# Patient Record
Sex: MALE | Race: WHITE | NOT HISPANIC OR LATINO | URBAN - METROPOLITAN AREA
[De-identification: names, ages, dates, MRNs, and addresses within clinical notes are randomized per-mention and may not be internally consistent; named-entity substitution may affect disease eponyms.]

---

## 2019-01-23 ENCOUNTER — OFFICE VISIT - HEALTHEAST (OUTPATIENT)
Dept: INTERNAL MEDICINE | Facility: CLINIC | Age: 59
End: 2019-01-23

## 2019-01-23 DIAGNOSIS — E11.9 TYPE 2 DIABETES MELLITUS WITHOUT COMPLICATION, WITHOUT LONG-TERM CURRENT USE OF INSULIN (H): ICD-10-CM

## 2019-01-23 DIAGNOSIS — R53.83 FATIGUE, UNSPECIFIED TYPE: ICD-10-CM

## 2019-01-23 DIAGNOSIS — I10 ESSENTIAL HYPERTENSION, BENIGN: ICD-10-CM

## 2019-01-23 DIAGNOSIS — Z00.00 ROUTINE HEALTH MAINTENANCE: ICD-10-CM

## 2019-01-23 DIAGNOSIS — Z12.5 SCREENING FOR PROSTATE CANCER: ICD-10-CM

## 2019-01-23 LAB
ALBUMIN SERPL-MCNC: 4.4 G/DL (ref 3.5–5)
ALP SERPL-CCNC: 100 U/L (ref 45–120)
ALT SERPL W P-5'-P-CCNC: 49 U/L (ref 0–45)
ANION GAP SERPL CALCULATED.3IONS-SCNC: 14 MMOL/L (ref 5–18)
AST SERPL W P-5'-P-CCNC: 24 U/L (ref 0–40)
BILIRUB SERPL-MCNC: 0.7 MG/DL (ref 0–1)
BUN SERPL-MCNC: 16 MG/DL (ref 8–22)
CALCIUM SERPL-MCNC: 9.5 MG/DL (ref 8.5–10.5)
CHLORIDE BLD-SCNC: 103 MMOL/L (ref 98–107)
CO2 SERPL-SCNC: 22 MMOL/L (ref 22–31)
CREAT SERPL-MCNC: 0.98 MG/DL (ref 0.7–1.3)
ERYTHROCYTE [DISTWIDTH] IN BLOOD BY AUTOMATED COUNT: 12.4 % (ref 11–14.5)
GFR SERPL CREATININE-BSD FRML MDRD: >60 ML/MIN/1.73M2
GLUCOSE BLD-MCNC: 271 MG/DL (ref 70–125)
HBA1C MFR BLD: 13.7 % (ref 3.5–6)
HCT VFR BLD AUTO: 48.2 % (ref 40–54)
HGB BLD-MCNC: 16.2 G/DL (ref 14–18)
LDLC SERPL CALC-MCNC: 147 MG/DL
MCH RBC QN AUTO: 30.1 PG (ref 27–34)
MCHC RBC AUTO-ENTMCNC: 33.6 G/DL (ref 32–36)
MCV RBC AUTO: 90 FL (ref 80–100)
PLATELET # BLD AUTO: 195 THOU/UL (ref 140–440)
PMV BLD AUTO: 8.7 FL (ref 7–10)
POTASSIUM BLD-SCNC: 3.9 MMOL/L (ref 3.5–5)
PROT SERPL-MCNC: 7 G/DL (ref 6–8)
PSA SERPL-MCNC: 4.3 NG/ML (ref 0–3.5)
RBC # BLD AUTO: 5.39 MILL/UL (ref 4.4–6.2)
SODIUM SERPL-SCNC: 139 MMOL/L (ref 136–145)
TSH SERPL DL<=0.005 MIU/L-ACNC: 1.31 UIU/ML (ref 0.3–5)
WBC: 6.3 THOU/UL (ref 4–11)

## 2019-01-23 RX ORDER — GLUCOSAMINE HCL/CHONDROITIN SU 500-400 MG
CAPSULE ORAL 2 TIMES DAILY
Status: SHIPPED | COMMUNITY
Start: 2010-05-17

## 2019-01-23 ASSESSMENT — MIFFLIN-ST. JEOR: SCORE: 1878.55

## 2019-01-24 ENCOUNTER — COMMUNICATION - HEALTHEAST (OUTPATIENT)
Dept: INTERNAL MEDICINE | Facility: CLINIC | Age: 59
End: 2019-01-24

## 2019-02-07 ENCOUNTER — OFFICE VISIT - HEALTHEAST (OUTPATIENT)
Dept: INTERNAL MEDICINE | Facility: CLINIC | Age: 59
End: 2019-02-07

## 2019-02-07 DIAGNOSIS — K40.90 LEFT INGUINAL HERNIA: ICD-10-CM

## 2019-02-07 DIAGNOSIS — E11.9 TYPE 2 DIABETES MELLITUS WITHOUT COMPLICATION, WITHOUT LONG-TERM CURRENT USE OF INSULIN (H): ICD-10-CM

## 2019-02-07 DIAGNOSIS — R97.20 ELEVATED PROSTATE SPECIFIC ANTIGEN (PSA): ICD-10-CM

## 2019-02-07 DIAGNOSIS — J32.9 CHRONIC SINUSITIS, UNSPECIFIED LOCATION: ICD-10-CM

## 2019-02-07 DIAGNOSIS — I10 ESSENTIAL HYPERTENSION, BENIGN: ICD-10-CM

## 2019-02-07 DIAGNOSIS — E78.00 HYPERCHOLESTEROLEMIA: ICD-10-CM

## 2019-02-07 DIAGNOSIS — K76.0 FATTY LIVER: ICD-10-CM

## 2019-02-07 DIAGNOSIS — Z51.81 MEDICATION MONITORING ENCOUNTER: ICD-10-CM

## 2019-02-07 DIAGNOSIS — Z80.0 FAMILY HISTORY OF COLON CANCER IN MOTHER: ICD-10-CM

## 2019-02-07 LAB
ALBUMIN SERPL-MCNC: 4.2 G/DL (ref 3.5–5)
ALP SERPL-CCNC: 82 U/L (ref 45–120)
ALT SERPL W P-5'-P-CCNC: 41 U/L (ref 0–45)
ANION GAP SERPL CALCULATED.3IONS-SCNC: 8 MMOL/L (ref 5–18)
AST SERPL W P-5'-P-CCNC: 23 U/L (ref 0–40)
BILIRUB SERPL-MCNC: 1.2 MG/DL (ref 0–1)
BUN SERPL-MCNC: 15 MG/DL (ref 8–22)
CALCIUM SERPL-MCNC: 9.7 MG/DL (ref 8.5–10.5)
CHLORIDE BLD-SCNC: 104 MMOL/L (ref 98–107)
CO2 SERPL-SCNC: 26 MMOL/L (ref 22–31)
CREAT SERPL-MCNC: 1.04 MG/DL (ref 0.7–1.3)
GFR SERPL CREATININE-BSD FRML MDRD: >60 ML/MIN/1.73M2
GLUCOSE BLD-MCNC: 274 MG/DL (ref 70–125)
POTASSIUM BLD-SCNC: 4.6 MMOL/L (ref 3.5–5)
PROT SERPL-MCNC: 6.6 G/DL (ref 6–8)
SODIUM SERPL-SCNC: 138 MMOL/L (ref 136–145)

## 2019-02-08 ENCOUNTER — COMMUNICATION - HEALTHEAST (OUTPATIENT)
Dept: INTERNAL MEDICINE | Facility: CLINIC | Age: 59
End: 2019-02-08

## 2019-03-21 ENCOUNTER — COMMUNICATION - HEALTHEAST (OUTPATIENT)
Dept: INTERNAL MEDICINE | Facility: CLINIC | Age: 59
End: 2019-03-21

## 2019-03-21 ENCOUNTER — OFFICE VISIT - HEALTHEAST (OUTPATIENT)
Dept: INTERNAL MEDICINE | Facility: CLINIC | Age: 59
End: 2019-03-21

## 2019-03-21 DIAGNOSIS — E78.00 HYPERCHOLESTEROLEMIA: ICD-10-CM

## 2019-03-21 DIAGNOSIS — Z51.81 MEDICATION MONITORING ENCOUNTER: ICD-10-CM

## 2019-03-21 DIAGNOSIS — E11.9 TYPE 2 DIABETES MELLITUS WITHOUT COMPLICATION, WITHOUT LONG-TERM CURRENT USE OF INSULIN (H): ICD-10-CM

## 2019-03-21 DIAGNOSIS — I10 ESSENTIAL HYPERTENSION, BENIGN: ICD-10-CM

## 2019-03-21 DIAGNOSIS — R97.20 ELEVATED PROSTATE SPECIFIC ANTIGEN (PSA): ICD-10-CM

## 2019-03-21 LAB
ANION GAP SERPL CALCULATED.3IONS-SCNC: 13 MMOL/L (ref 5–18)
BUN SERPL-MCNC: 16 MG/DL (ref 8–22)
CALCIUM SERPL-MCNC: 9.3 MG/DL (ref 8.5–10.5)
CHLORIDE BLD-SCNC: 108 MMOL/L (ref 98–107)
CO2 SERPL-SCNC: 20 MMOL/L (ref 22–31)
CREAT SERPL-MCNC: 0.92 MG/DL (ref 0.7–1.3)
GFR SERPL CREATININE-BSD FRML MDRD: >60 ML/MIN/1.73M2
GLUCOSE BLD-MCNC: 112 MG/DL (ref 70–125)
POTASSIUM BLD-SCNC: 3.9 MMOL/L (ref 3.5–5)
PSA SERPL-MCNC: 4.7 NG/ML (ref 0–3.5)
SODIUM SERPL-SCNC: 141 MMOL/L (ref 136–145)

## 2019-03-22 ENCOUNTER — COMMUNICATION - HEALTHEAST (OUTPATIENT)
Dept: INTERNAL MEDICINE | Facility: CLINIC | Age: 59
End: 2019-03-22

## 2019-05-06 ENCOUNTER — COMMUNICATION - HEALTHEAST (OUTPATIENT)
Dept: INTERNAL MEDICINE | Facility: CLINIC | Age: 59
End: 2019-05-06

## 2019-05-06 DIAGNOSIS — I10 ESSENTIAL HYPERTENSION, BENIGN: ICD-10-CM

## 2019-07-29 ENCOUNTER — OFFICE VISIT - HEALTHEAST (OUTPATIENT)
Dept: INTERNAL MEDICINE | Facility: CLINIC | Age: 59
End: 2019-07-29

## 2019-07-29 DIAGNOSIS — I10 ESSENTIAL HYPERTENSION, BENIGN: ICD-10-CM

## 2019-07-29 DIAGNOSIS — E11.9 TYPE 2 DIABETES MELLITUS WITHOUT COMPLICATION, WITHOUT LONG-TERM CURRENT USE OF INSULIN (H): ICD-10-CM

## 2019-07-29 LAB
ALBUMIN SERPL-MCNC: 4.6 G/DL (ref 3.5–5)
ALP SERPL-CCNC: 65 U/L (ref 45–120)
ALT SERPL W P-5'-P-CCNC: 31 U/L (ref 0–45)
ANION GAP SERPL CALCULATED.3IONS-SCNC: 12 MMOL/L (ref 5–18)
AST SERPL W P-5'-P-CCNC: 23 U/L (ref 0–40)
BILIRUB SERPL-MCNC: 1.4 MG/DL (ref 0–1)
BUN SERPL-MCNC: 14 MG/DL (ref 8–22)
CALCIUM SERPL-MCNC: 9.7 MG/DL (ref 8.5–10.5)
CHLORIDE BLD-SCNC: 105 MMOL/L (ref 98–107)
CO2 SERPL-SCNC: 23 MMOL/L (ref 22–31)
CREAT SERPL-MCNC: 1.16 MG/DL (ref 0.7–1.3)
CREAT UR-MCNC: 227.5 MG/DL
GFR SERPL CREATININE-BSD FRML MDRD: >60 ML/MIN/1.73M2
GLUCOSE BLD-MCNC: 151 MG/DL (ref 70–125)
HBA1C MFR BLD: 6.8 % (ref 3.5–6)
MICROALBUMIN UR-MCNC: 1.58 MG/DL (ref 0–1.99)
MICROALBUMIN/CREAT UR: 6.9 MG/G
POTASSIUM BLD-SCNC: 4.1 MMOL/L (ref 3.5–5)
PROT SERPL-MCNC: 7 G/DL (ref 6–8)
SODIUM SERPL-SCNC: 140 MMOL/L (ref 136–145)

## 2019-12-18 ENCOUNTER — COMMUNICATION - HEALTHEAST (OUTPATIENT)
Dept: INTERNAL MEDICINE | Facility: CLINIC | Age: 59
End: 2019-12-18

## 2019-12-18 DIAGNOSIS — I10 ESSENTIAL HYPERTENSION, BENIGN: ICD-10-CM

## 2020-01-14 ENCOUNTER — COMMUNICATION - HEALTHEAST (OUTPATIENT)
Dept: INTERNAL MEDICINE | Facility: CLINIC | Age: 60
End: 2020-01-14

## 2020-01-14 DIAGNOSIS — I10 ESSENTIAL HYPERTENSION, BENIGN: ICD-10-CM

## 2020-07-14 ENCOUNTER — COMMUNICATION - HEALTHEAST (OUTPATIENT)
Dept: INTERNAL MEDICINE | Facility: CLINIC | Age: 60
End: 2020-07-14

## 2020-07-14 DIAGNOSIS — I10 ESSENTIAL HYPERTENSION, BENIGN: ICD-10-CM

## 2020-07-14 RX ORDER — AMLODIPINE BESYLATE 5 MG/1
5 TABLET ORAL DAILY
Qty: 90 TABLET | Refills: 3 | Status: SHIPPED | OUTPATIENT
Start: 2020-07-14

## 2020-08-06 ENCOUNTER — COMMUNICATION - HEALTHEAST (OUTPATIENT)
Dept: INTERNAL MEDICINE | Facility: CLINIC | Age: 60
End: 2020-08-06

## 2020-08-06 DIAGNOSIS — I10 ESSENTIAL HYPERTENSION, BENIGN: ICD-10-CM

## 2020-10-01 ENCOUNTER — COMMUNICATION - HEALTHEAST (OUTPATIENT)
Dept: INTERNAL MEDICINE | Facility: CLINIC | Age: 60
End: 2020-10-01

## 2020-10-01 DIAGNOSIS — E11.9 TYPE 2 DIABETES MELLITUS WITHOUT COMPLICATION, WITHOUT LONG-TERM CURRENT USE OF INSULIN (H): ICD-10-CM

## 2020-10-30 ENCOUNTER — COMMUNICATION - HEALTHEAST (OUTPATIENT)
Dept: INTERNAL MEDICINE | Facility: CLINIC | Age: 60
End: 2020-10-30

## 2020-10-30 DIAGNOSIS — I10 ESSENTIAL HYPERTENSION, BENIGN: ICD-10-CM

## 2020-11-02 ENCOUNTER — COMMUNICATION - HEALTHEAST (OUTPATIENT)
Dept: INTERNAL MEDICINE | Facility: CLINIC | Age: 60
End: 2020-11-02

## 2020-11-02 DIAGNOSIS — E11.9 TYPE 2 DIABETES MELLITUS WITHOUT COMPLICATION, WITHOUT LONG-TERM CURRENT USE OF INSULIN (H): ICD-10-CM

## 2020-11-06 ENCOUNTER — COMMUNICATION - HEALTHEAST (OUTPATIENT)
Dept: INTERNAL MEDICINE | Facility: CLINIC | Age: 60
End: 2020-11-06

## 2020-11-06 DIAGNOSIS — E11.9 TYPE 2 DIABETES MELLITUS WITHOUT COMPLICATION, WITHOUT LONG-TERM CURRENT USE OF INSULIN (H): ICD-10-CM

## 2020-11-20 ENCOUNTER — COMMUNICATION - HEALTHEAST (OUTPATIENT)
Dept: INTERNAL MEDICINE | Facility: CLINIC | Age: 60
End: 2020-11-20

## 2020-11-20 DIAGNOSIS — E11.9 TYPE 2 DIABETES MELLITUS WITHOUT COMPLICATION, WITHOUT LONG-TERM CURRENT USE OF INSULIN (H): ICD-10-CM

## 2020-11-20 DIAGNOSIS — I10 ESSENTIAL HYPERTENSION, BENIGN: ICD-10-CM

## 2020-12-21 ENCOUNTER — COMMUNICATION - HEALTHEAST (OUTPATIENT)
Dept: FAMILY MEDICINE | Facility: CLINIC | Age: 60
End: 2020-12-21

## 2020-12-21 DIAGNOSIS — E11.9 TYPE 2 DIABETES MELLITUS WITHOUT COMPLICATION, WITHOUT LONG-TERM CURRENT USE OF INSULIN (H): ICD-10-CM

## 2020-12-28 ENCOUNTER — COMMUNICATION - HEALTHEAST (OUTPATIENT)
Dept: INTERNAL MEDICINE | Facility: CLINIC | Age: 60
End: 2020-12-28

## 2020-12-28 DIAGNOSIS — E11.9 TYPE 2 DIABETES MELLITUS WITHOUT COMPLICATION, WITHOUT LONG-TERM CURRENT USE OF INSULIN (H): ICD-10-CM

## 2021-01-25 ENCOUNTER — COMMUNICATION - HEALTHEAST (OUTPATIENT)
Dept: INTERNAL MEDICINE | Facility: CLINIC | Age: 61
End: 2021-01-25

## 2021-01-25 DIAGNOSIS — E11.9 TYPE 2 DIABETES MELLITUS WITHOUT COMPLICATION, WITHOUT LONG-TERM CURRENT USE OF INSULIN (H): ICD-10-CM

## 2021-02-23 ENCOUNTER — COMMUNICATION - HEALTHEAST (OUTPATIENT)
Dept: INTERNAL MEDICINE | Facility: CLINIC | Age: 61
End: 2021-02-23

## 2021-02-23 DIAGNOSIS — E11.9 TYPE 2 DIABETES MELLITUS WITHOUT COMPLICATION, WITHOUT LONG-TERM CURRENT USE OF INSULIN (H): ICD-10-CM

## 2021-03-12 ENCOUNTER — COMMUNICATION - HEALTHEAST (OUTPATIENT)
Dept: INTERNAL MEDICINE | Facility: CLINIC | Age: 61
End: 2021-03-12

## 2021-03-12 DIAGNOSIS — I10 ESSENTIAL HYPERTENSION, BENIGN: ICD-10-CM

## 2021-03-12 RX ORDER — LOSARTAN POTASSIUM 50 MG/1
TABLET ORAL
Qty: 180 TABLET | Refills: 0 | Status: SHIPPED | OUTPATIENT
Start: 2021-03-12

## 2021-03-15 ENCOUNTER — COMMUNICATION - HEALTHEAST (OUTPATIENT)
Dept: INTERNAL MEDICINE | Facility: CLINIC | Age: 61
End: 2021-03-15

## 2021-03-15 DIAGNOSIS — E11.9 TYPE 2 DIABETES MELLITUS WITHOUT COMPLICATION, WITHOUT LONG-TERM CURRENT USE OF INSULIN (H): ICD-10-CM

## 2021-05-28 NOTE — TELEPHONE ENCOUNTER
Medication Question or Clarification  Who is calling: Patient  What medication are you calling about? (include dose and sig)   amLODIPine (NORVASC) 2.5 MG tablet 90 tablet 1 3/21/2019     Sig - Route: Take 1 tablet (2.5 mg total) by mouth daily. - Oral    Sent to pharmacy as: amLODIPine (NORVASC) 2.5 MG tablet    E-Prescribing Status: Receipt confirmed by pharmacy (3/21/2019 10:55 AM CDT)        Who prescribed the medication?: Jonel Bell MD   What is your question/concern?: Patient is requesting an increase to 5 mg tablets stating I take my b/p regularly and its still running about 145/90s.  Patient also stated I will be leaving out of town and would like to refill this medication early to take with me but the pharmacy has denied this request. Please give pharmacy approval to fill early if increase is not approved.   Pharmacy: [facility]  Okay to leave a detailed message?: Yes  Site CMT - Please call the pharmacy to obtain any additional needed information.

## 2021-05-30 NOTE — PATIENT INSTRUCTIONS - HE
Refills of medication sent in.    Recheck hemoglobin A1c today.  Goal A1c should be less than 7.    Blood pressure looks excellent today.    Continue with diet and exercise changes.  You are making good progress.    If hemoglobin A1c is still not at goal, we will increase metformin to 1000 mg twice daily.    You could consider splitting up your amlodipine to a 2.5 mg dose twice daily for elevated afternoon blood pressures.    Follow-up with either I or Dr. Jonel Bell in 3 months for repeat diabetes check.

## 2021-05-30 NOTE — PROGRESS NOTES
Clinic Note    Assessment:     Assessment and Plan:  1. Type 2 diabetes mellitus without complication, without long-term current use of insulin (H)  Degree of control to be determined.  If he remains outside of goal range, we will increase his metformin to 1000 mg twice daily.  Follow-up in 3 months.  - Comprehensive Metabolic Panel  - Microalbumin, Random Urine  - Glycosylated Hemoglobin A1c  - metFORMIN (GLUCOPHAGE) 500 MG tablet; Take 1 with breakfast.  Take 2 tablets with supper.  Dispense: 270 tablet; Refill: 3    2. Essential hypertension, benign  Well-controlled on amlodipine 5 mg and losartan 50 mg twice daily.  He has had periodic elevated blood pressures in the afternoon.  I told him he could split up his amlodipine to 2.5 mg twice daily to see if this would help.     Patient Instructions   Refills of medication sent in.    Recheck hemoglobin A1c today.  Goal A1c should be less than 7.    Blood pressure looks excellent today.    Continue with diet and exercise changes.  You are making good progress.    If hemoglobin A1c is still not at goal, we will increase metformin to 1000 mg twice daily.    You could consider splitting up your amlodipine to a 2.5 mg dose twice daily for elevated afternoon blood pressures.    Follow-up with either I or Dr. Jonel Bell in 3 months for repeat diabetes check.    Return in about 3 months (around 10/29/2019).         Subjective:      Patient comes to clinic today for diabetes and blood pressure check.    He was last seen by his PCP, Dr. Jonel Bell on 3/21/2019.  Blood pressure had been elevated at that time, he was told to increase his amlodipine to 5 mg daily.    Diabetes had been poorly controlled, with hemoglobin A1c greater than 13.  He has been making some significant dietary and activity changes at home.  Trying to increase fruits and vegetables.  He has decreased his simple carbohydrates.    He walks his dogs regularly.    Still checking his blood sugars multiple  times per day.  Most fasting sugars in the morning goal range, but some may be as high as 180.  These measurements seem to be correlated to dietary indiscretion the evening before.    No hypoglycemia.    He has been making some strides with weight loss.  Down about 20 pounds from a high of 240 pounds earlier.    The following portions of the patient's history were reviewed and updated as appropriate: Allergies, medications, problems, prior note.    Review of Systems:    Review is otherwise negative except for what is mentioned above.     Social Hx:    Social History     Tobacco Use   Smoking Status Never Smoker   Smokeless Tobacco Never Used         Objective:     Vitals:    07/29/19 1044   BP: 114/77   Pulse: 83   Weight: (!) 224 lb 11.2 oz (101.9 kg)       Exam:    General: No apparent distress. Calm. Alert and Oriented X3. Pt behavior is appropriate.      Patient Active Problem List   Diagnosis     Fatty liver     Hypercholesterolemia     Elevated prostate specific antigen (PSA)     Type 2 diabetes mellitus without complication, without long-term current use of insulin (H)     Essential hypertension, benign     Family history of colon cancer in mother     Left inguinal hernia     Chronic sinusitis, unspecified location     Current Outpatient Medications   Medication Sig Dispense Refill     amLODIPine (NORVASC) 5 MG tablet Take 1 tablet (5 mg total) by mouth daily. 90 tablet 3     ascorbate calcium (VITAMIN C ORAL) Take 1 tablet by mouth daily.       aspirin 81 MG EC tablet Take 1 tablet (81 mg total) by mouth daily.  0     b complex vitamins (B COMPLEX-VITAMIN B12) tablet Take 1 tablet by mouth 3 (three) times a week.       BIOTIN ORAL Take 1 tablet by mouth 3 (three) times a week.              blood glucose test (GLUCOSE BLOOD) strips 2 (two) times a day.       cholecalciferol, vitamin D3, (VITAMIN D3 ORAL) Take 2,000 Units by mouth 3 (three) times a week. Not taken during summer.              chromium  picolin,hist/minerals (CHROMIUM PICOLINAT,HISTID-MINS ORAL) Take 1 tablet by mouth 3 (three) times a week.              GYMNEMA LEAF, BULK, MISC Use 1 capsule As Directed 3 (three) times a week.              losartan (COZAAR) 50 MG tablet Take 1 tablet (50 mg total) by mouth 2 (two) times a day. 180 tablet 3     metFORMIN (GLUCOPHAGE) 500 MG tablet Take 1 with breakfast.  Take 2 tablets with supper. 270 tablet 3     MULTIVITAMIN ORAL Take 1 tablet by mouth daily.       UNABLE TO FIND 1 capsule taken orally 3 times weekly. Med Name: Beet root.       UNABLE TO FIND 1 capsule taken orally daily. Med Name: Tumeric.       UNABLE TO FIND 1 capsule taken 3 times weekly. Med Name: Stinging Nettle.       UNABLE TO FIND 2 capsules taken daily. Med Name: Apple cider vinegar.       VANADYL SULFATE, BULK, MISC Use 1 tablet As Directed 3 (three) times a week.       vitamin E acetate (VITAMIN E ORAL) Take 1 tablet by mouth 3 (three) times a week.              No current facility-administered medications for this visit.        I spent 20 minutes with patient face to face, of which >50% was counseling regarding the above plan       Víctor Lemus (Rob), GISSEL    7/29/2019

## 2021-06-02 VITALS — BODY MASS INDEX: 33.85 KG/M2 | WEIGHT: 235.9 LBS

## 2021-06-02 VITALS — WEIGHT: 236.4 LBS | BODY MASS INDEX: 33.84 KG/M2 | HEIGHT: 70 IN

## 2021-06-02 VITALS — WEIGHT: 219.4 LBS | BODY MASS INDEX: 31.48 KG/M2

## 2021-06-03 VITALS — WEIGHT: 224.7 LBS | BODY MASS INDEX: 32.24 KG/M2

## 2021-06-04 NOTE — TELEPHONE ENCOUNTER
Medication refill request declined: requested too soon     Disp Refills Start End     losartan (COZAAR) 50 MG tablet 180 tablet 3 7/29/2019     Sig - Route: Take 1 tablet (50 mg total) by mouth 2 (two) times a day. - Oral    Sent to pharmacy as: losartan (COZAAR) 50 MG tablet    E-Prescribing Status: Receipt confirmed by pharmacy (7/29/2019 11:05 AM CDT)      Zora Ramirez RN BSBA [facility] Triage/Med Refill 12/20/2019 11:41 AM

## 2021-06-05 NOTE — TELEPHONE ENCOUNTER
Medication Request  Medication name: Losartan 25 mg  [facility]ed [facility]  Reason for request: Unknown, request came by fax from pharmacy.   When did you use medication last?:  Unknown, request came by fax from pharmacy.   Patient offered appointment:  N/A - electronic request  Okay to leave a detailed message: no

## 2021-06-09 NOTE — TELEPHONE ENCOUNTER
Refill request received from [facility] via fax for a refill of Amlodapine. Order pended.    Last OV/VV on 7/29/2019  Next scheduled appointment with PCP None

## 2021-06-10 NOTE — TELEPHONE ENCOUNTER
RN cannot approve Refill Request    RN can NOT refill this medication Protocol failed and NO refill given. Last office visit: 3/21/2019 Jonel Bell MD Last Physical: 1/23/2019 Last MTM visit: Visit date not found Last visit same specialty: 7/29/2019 Víctor Lemus CNP.  Next visit within 3 mo: Visit date not found  Next physical within 3 mo: Visit date not found      Halle Martinez, [facility] Triage/Med Refill 8/6/2020    Requested Prescriptions   Pending Prescriptions Disp Refills     losartan (COZAAR) 50 MG tablet [Pharmacy Med Name: LOSARTAN POTASSIUM 50 MG TAB] 180 tablet 2     Sig: TAKE 1 TABLET BY MOUTH TWICE A DAY       Angiotensin Receptor Blocker Protocol Failed - 8/6/2020  1:17 AM        Failed - PCP or prescribing provider visit in past 12 months       Last office visit with prescriber/PCP: 3/21/2019 Jonel Bell MD OR same dept: Visit date not found OR same specialty: 7/29/2019 Víctor Lemus CNP  Last physical: 1/23/2019 Last MTM visit: Visit date not found   Next visit within 3 mo: Visit date not found  Next physical within 3 mo: Visit date not found  Prescriber OR PCP: Jonel Bell MD  Last diagnosis associated with med order: 1. Essential hypertension, benign  - losartan (COZAAR) 50 MG tablet [Pharmacy Med Name: LOSARTAN POTASSIUM 50 MG TAB]; TAKE 1 TABLET BY MOUTH TWICE A DAY  Dispense: 180 tablet; Refill: 2    If protocol passes may refill for 12 months if within 3 months of last provider visit (or a total of 15 months).             Failed - Serum potassium within the past 12 months     No results found for: LN-POTASSIUM          Failed - Blood pressure filed in past 12 months     BP Readings from Last 1 Encounters:   07/29/19 114/77             Failed - Serum creatinine within the past 12 months     Creatinine   Date Value Ref Range Status   07/29/2019 1.16 0.70 - 1.30 mg/dL Final

## 2021-06-10 NOTE — TELEPHONE ENCOUNTER
LMTCB - please help pt schedule his yearly exam     STEPHANY Gallegos  11:44 AM ........ 8/6/2020

## 2021-06-10 NOTE — TELEPHONE ENCOUNTER
Tell patient that he is overdue for his yearly physical.  Have patient make physical exam appointment this fall.

## 2021-06-10 NOTE — TELEPHONE ENCOUNTER
Who is calling:  Patient  Reason for Call:    Returning call.  This nurse relayed Provider message to patient.  Transferred caller to scheduling.  Date of last appointment with primary care: 7/29/2019  Okay to leave a detailed message: Yes

## 2021-06-11 NOTE — TELEPHONE ENCOUNTER
Refill Request  Did you contact pharmacy: Yes  Medication name:   Requested Prescriptions     Pending Prescriptions Disp Refills     metFORMIN (GLUCOPHAGE) 500 MG tablet 270 tablet 3     Sig: Take 1 with breakfast.  Take 2 tablets with supper.     Who prescribed the medication: Víctor Lemus  [facility]  Is patient out of medication: N/A  Patient notified refills processed in 3 business days:  N/A  Okay to leave a detailed message: yes

## 2021-06-12 NOTE — TELEPHONE ENCOUNTER
RN cannot approve Refill Request    RN can NOT refill this medication Protocol failed and NO refill given. Last office visit: 3/21/2019 Jonel Bell MD Last Physical: 1/23/2019 Last MTM visit: Visit date not found Last visit same specialty: 7/29/2019 Víctor Lemus CNP.  Next visit within 3 mo: Visit date not found  Next physical within 3 mo: Visit date not found      Halle Martinez, [facility] Triage/Med Refill 10/5/2020    Requested Prescriptions   Pending Prescriptions Disp Refills     metFORMIN (GLUCOPHAGE) 500 MG tablet 270 tablet 3     Sig: Take 1 with breakfast.  Take 2 tablets with supper.       Metformin Refill Protocol Failed - 10/1/2020  4:13 PM        Failed - Blood pressure in last 12 months     BP Readings from Last 1 Encounters:   07/29/19 114/77             Failed - LFT or AST or ALT in last 12 months     Albumin   Date Value Ref Range Status   07/29/2019 4.6 3.5 - 5.0 g/dL Final     Bilirubin, Total   Date Value Ref Range Status   07/29/2019 1.4 (H) 0.0 - 1.0 mg/dL Final     Alkaline Phosphatase   Date Value Ref Range Status   07/29/2019 65 45 - 120 U/L Final     AST   Date Value Ref Range Status   07/29/2019 23 0 - 40 U/L Final     ALT   Date Value Ref Range Status   07/29/2019 31 0 - 45 U/L Final     Protein, Total   Date Value Ref Range Status   07/29/2019 7.0 6.0 - 8.0 g/dL Final                Failed - GFR or Serum Creatinine in last 6 months     GFR MDRD Non Af Amer   Date Value Ref Range Status   07/29/2019 >60 >60 mL/min/1.73m2 Final     GFR MDRD Af Amer   Date Value Ref Range Status   07/29/2019 >60 >60 mL/min/1.73m2 Final             Failed - Visit with PCP or prescribing provider visit in last 6 months or next 3 months     Last office visit with prescriber/PCP: Visit date not found OR same dept: Visit date not found OR same specialty: 7/29/2019 Víctor Lemus CNP Last physical: Visit date not found Last MTM visit: Visit date not found         Next appt within 3 mo: Visit date  not found  Next physical within 3 mo: Visit date not found  Prescriber OR PCP: Jonel Bell MD  Last diagnosis associated with med order: 1. Type 2 diabetes mellitus without complication, without long-term current use of insulin (H)  - metFORMIN (GLUCOPHAGE) 500 MG tablet; Take 1 with breakfast.  Take 2 tablets with supper.  Dispense: 270 tablet; Refill: 3     If protocol passes may refill for 12 months if within 3 months of last provider visit (or a total of 15 months).           Failed - A1C in last 6 months     Hemoglobin A1c   Date Value Ref Range Status   07/29/2019 6.8 (H) 3.5 - 6.0 % Final               Failed - Microalbumin in last year      Microalbumin, Random Urine   Date Value Ref Range Status   07/29/2019 1.58 0.00 - 1.99 mg/dL Final

## 2021-06-12 NOTE — TELEPHONE ENCOUNTER
RN cannot approve Refill Request    RN can NOT refill this medication Protocol failed and NO refill given. Last office visit: 3/21/2019 Jonel Bell MD Last Physical: 1/23/2019 Last MTM visit: Visit date not found Last visit same specialty: 7/29/2019 Víctro Lemus CNP.  Next visit within 3 mo: Visit date not found  Next physical within 3 mo: Visit date not found      Halle Martinez, [facility] Triage/Med Refill 11/2/2020    Requested Prescriptions   Pending Prescriptions Disp Refills     losartan (COZAAR) 50 MG tablet 180 tablet 0     Sig: Take 1 tablet (50 mg total) by mouth 2 (two) times a day.       Angiotensin Receptor Blocker Protocol Failed - 10/30/2020  2:44 PM        Failed - PCP or prescribing provider visit in past 12 months       Last office visit with prescriber/PCP: 3/21/2019 Jonel Bell MD OR same dept: Visit date not found OR same specialty: 7/29/2019 Víctor Lemus CNP  Last physical: 1/23/2019 Last MTM visit: Visit date not found   Next visit within 3 mo: Visit date not found  Next physical within 3 mo: Visit date not found  Prescriber OR PCP: Jonel Bell MD  Last diagnosis associated with med order: 1. Essential hypertension, benign  - losartan (COZAAR) 50 MG tablet; Take 1 tablet (50 mg total) by mouth 2 (two) times a day.  Dispense: 180 tablet; Refill: 0    If protocol passes may refill for 12 months if within 3 months of last provider visit (or a total of 15 months).             Failed - Serum potassium within the past 12 months     No results found for: LN-POTASSIUM          Failed - Blood pressure filed in past 12 months     BP Readings from Last 1 Encounters:   07/29/19 114/77             Failed - Serum creatinine within the past 12 months     Creatinine   Date Value Ref Range Status   07/29/2019 1.16 0.70 - 1.30 mg/dL Final

## 2021-06-12 NOTE — TELEPHONE ENCOUNTER
RN cannot approve Refill Request    RN can NOT refill this medication Protocol failed and NO refill given. Last office visit: Visit date not found Last Physical: Visit date not found Last MTM visit: Visit date not found Last visit same specialty: 7/29/2019 Víctor Lemus CNP.  Next visit within 3 mo: Visit date not found  Next physical within 3 mo: Visit date not found      Halle Martinez, [facility] Triage/Med Refill 11/4/2020    Requested Prescriptions   Pending Prescriptions Disp Refills     metFORMIN (GLUCOPHAGE) 500 MG tablet [Pharmacy Med Name: METFORMIN  MG TABLET] 90 tablet 0     Sig: TAKE 1 WITH BREAKFAST. TAKE 2 TABLETS WITH SUPPER.       Metformin Refill Protocol Failed - 11/2/2020  9:31 AM        Failed - Blood pressure in last 12 months     BP Readings from Last 1 Encounters:   07/29/19 114/77             Failed - LFT or AST or ALT in last 12 months     Albumin   Date Value Ref Range Status   07/29/2019 4.6 3.5 - 5.0 g/dL Final     Bilirubin, Total   Date Value Ref Range Status   07/29/2019 1.4 (H) 0.0 - 1.0 mg/dL Final     Alkaline Phosphatase   Date Value Ref Range Status   07/29/2019 65 45 - 120 U/L Final     AST   Date Value Ref Range Status   07/29/2019 23 0 - 40 U/L Final     ALT   Date Value Ref Range Status   07/29/2019 31 0 - 45 U/L Final     Protein, Total   Date Value Ref Range Status   07/29/2019 7.0 6.0 - 8.0 g/dL Final                Failed - GFR or Serum Creatinine in last 6 months     GFR MDRD Non Af Amer   Date Value Ref Range Status   07/29/2019 >60 >60 mL/min/1.73m2 Final     GFR MDRD Af Amer   Date Value Ref Range Status   07/29/2019 >60 >60 mL/min/1.73m2 Final             Failed - Visit with PCP or prescribing provider visit in last 6 months or next 3 months     Last office visit with prescriber/PCP: Visit date not found OR same dept: Visit date not found OR same specialty: 7/29/2019 Víctor Lemus CNP Last physical: Visit date not found Last MTM visit: Visit date not  found         Next appt within 3 mo: Visit date not found  Next physical within 3 mo: Visit date not found  Prescriber OR PCP: Mckayla Bustillo CNP  Last diagnosis associated with med order: 1. Type 2 diabetes mellitus without complication, without long-term current use of insulin (H)  - metFORMIN (GLUCOPHAGE) 500 MG tablet [Pharmacy Med Name: METFORMIN  MG TABLET]; Take 1 with breakfast.  Take 2 tablets with supper.  Dispense: 90 tablet; Refill: 0     If protocol passes may refill for 12 months if within 3 months of last provider visit (or a total of 15 months).           Failed - A1C in last 6 months     Hemoglobin A1c   Date Value Ref Range Status   07/29/2019 6.8 (H) 3.5 - 6.0 % Final               Failed - Microalbumin in last year      Microalbumin, Random Urine   Date Value Ref Range Status   07/29/2019 1.58 0.00 - 1.99 mg/dL Final

## 2021-06-12 NOTE — TELEPHONE ENCOUNTER
RN cannot approve Refill Request    RN can NOT refill this medication PCP messaged that patient is overdue for Labs. Last office visit: Visit date not found Last Physical: Visit date not found Last MTM visit: Visit date not found Last visit same specialty: 7/29/2019 Víctor Lemus CNP.  Next visit within 3 mo: Visit date not found  Next physical within 3 mo: Visit date not found      Magdalena Otto, [facility] Triage/Med Refill 11/7/2020    Requested Prescriptions   Pending Prescriptions Disp Refills     metFORMIN (GLUCOPHAGE) 500 MG tablet [Pharmacy Med Name: METFORMIN  MG TABLET] 90 tablet 0     Sig: TAKE 1 WITH BREAKFAST. TAKE 2 TABLETS WITH SUPPER.       Metformin Refill Protocol Failed - 11/6/2020  9:28 AM        Failed - Blood pressure in last 12 months     BP Readings from Last 1 Encounters:   07/29/19 114/77             Failed - LFT or AST or ALT in last 12 months     Albumin   Date Value Ref Range Status   07/29/2019 4.6 3.5 - 5.0 g/dL Final     Bilirubin, Total   Date Value Ref Range Status   07/29/2019 1.4 (H) 0.0 - 1.0 mg/dL Final     Alkaline Phosphatase   Date Value Ref Range Status   07/29/2019 65 45 - 120 U/L Final     AST   Date Value Ref Range Status   07/29/2019 23 0 - 40 U/L Final     ALT   Date Value Ref Range Status   07/29/2019 31 0 - 45 U/L Final     Protein, Total   Date Value Ref Range Status   07/29/2019 7.0 6.0 - 8.0 g/dL Final                Failed - GFR or Serum Creatinine in last 6 months     GFR MDRD Non Af Amer   Date Value Ref Range Status   07/29/2019 >60 >60 mL/min/1.73m2 Final     GFR MDRD Af Amer   Date Value Ref Range Status   07/29/2019 >60 >60 mL/min/1.73m2 Final             Failed - Visit with PCP or prescribing provider visit in last 6 months or next 3 months     Last office visit with prescriber/PCP: Visit date not found OR same dept: Visit date not found OR same specialty: 7/29/2019 Víctor Lemus CNP Last physical: Visit date not found Last MTM visit:  Visit date not found         Next appt within 3 mo: Visit date not found  Next physical within 3 mo: Visit date not found  Prescriber OR PCP: Mckayla Bustillo CNP  Last diagnosis associated with med order: 1. Type 2 diabetes mellitus without complication, without long-term current use of insulin (H)  - metFORMIN (GLUCOPHAGE) 500 MG tablet [Pharmacy Med Name: METFORMIN  MG TABLET]; Take 1 with breakfast.  Take 2 tablets with supper.  Dispense: 90 tablet; Refill: 0     If protocol passes may refill for 12 months if within 3 months of last provider visit (or a total of 15 months).           Failed - A1C in last 6 months     Hemoglobin A1c   Date Value Ref Range Status   07/29/2019 6.8 (H) 3.5 - 6.0 % Final               Failed - Microalbumin in last year      Microalbumin, Random Urine   Date Value Ref Range Status   07/29/2019 1.58 0.00 - 1.99 mg/dL Final

## 2021-06-13 NOTE — TELEPHONE ENCOUNTER
Requested Prescriptions     Pending Prescriptions Disp Refills     metFORMIN (GLUCOPHAGE) 500 MG tablet 90 tablet 0     Sig: TAKE 1 WITH BREAKFAST. TAKE 2 TABLETS WITH SUPPER. - NEED TO MAKE APPT       Thanks,  Nancy Restrepo, CMA

## 2021-06-13 NOTE — TELEPHONE ENCOUNTER
RN cannot approve Refill Request    RN can NOT refill this medication PCP messaged that patient is overdue for Labs. Last office visit: 3/21/2019 Jonel Bell MD Last Physical: 1/23/2019 Last MTM visit: Visit date not found Last visit same specialty: 7/29/2019 Víctor Lemus CNP.  Next visit within 3 mo: Visit date not found  Next physical within 3 mo: Visit date not found      Magdalena Otto, [facility] Triage/Med Refill 11/21/2020    Requested Prescriptions   Pending Prescriptions Disp Refills     losartan (COZAAR) 50 MG tablet [Pharmacy Med Name: LOSARTAN POTASSIUM 50 MG TAB] 180 tablet 0     Sig: TAKE 1 TABLET BY MOUTH TWICE A DAY       Angiotensin Receptor Blocker Protocol Failed - 11/20/2020  1:48 PM        Failed - PCP or prescribing provider visit in past 12 months       Last office visit with prescriber/PCP: 3/21/2019 Jonel Bell MD OR same dept: Visit date not found OR same specialty: 7/29/2019 Víctor Lemus CNP  Last physical: 1/23/2019 Last MTM visit: Visit date not found   Next visit within 3 mo: Visit date not found  Next physical within 3 mo: Visit date not found  Prescriber OR PCP: Jonel Bell MD  Last diagnosis associated with med order: 1. Essential hypertension, benign  - losartan (COZAAR) 50 MG tablet [Pharmacy Med Name: LOSARTAN POTASSIUM 50 MG TAB]; TAKE 1 TABLET BY MOUTH TWICE A DAY  Dispense: 180 tablet; Refill: 0    2. Type 2 diabetes mellitus without complication, without long-term current use of insulin (H)  - metFORMIN (GLUCOPHAGE) 500 MG tablet [Pharmacy Med Name: METFORMIN  MG TABLET]; TAKE 1 WITH BREAKFAST. TAKE 2 TABLETS WITH SUPPER. - NEED TO MAKE APPT  Dispense: 90 tablet; Refill: 0    If protocol passes may refill for 12 months if within 3 months of last provider visit (or a total of 15 months).             Failed - Serum potassium within the past 12 months     No results found for: LN-POTASSIUM          Failed - Blood pressure filed in past 12 months      BP Readings from Last 1 Encounters:   07/29/19 114/77             Failed - Serum creatinine within the past 12 months     Creatinine   Date Value Ref Range Status   07/29/2019 1.16 0.70 - 1.30 mg/dL Final                metFORMIN (GLUCOPHAGE) 500 MG tablet [Pharmacy Med Name: METFORMIN  MG TABLET] 90 tablet 0     Sig: TAKE 1 WITH BREAKFAST. TAKE 2 TABLETS WITH SUPPER. - NEED TO MAKE APPT       Metformin Refill Protocol Failed - 11/20/2020  1:48 PM        Failed - Blood pressure in last 12 months     BP Readings from Last 1 Encounters:   07/29/19 114/77             Failed - LFT or AST or ALT in last 12 months     Albumin   Date Value Ref Range Status   07/29/2019 4.6 3.5 - 5.0 g/dL Final     Bilirubin, Total   Date Value Ref Range Status   07/29/2019 1.4 (H) 0.0 - 1.0 mg/dL Final     Alkaline Phosphatase   Date Value Ref Range Status   07/29/2019 65 45 - 120 U/L Final     AST   Date Value Ref Range Status   07/29/2019 23 0 - 40 U/L Final     ALT   Date Value Ref Range Status   07/29/2019 31 0 - 45 U/L Final     Protein, Total   Date Value Ref Range Status   07/29/2019 7.0 6.0 - 8.0 g/dL Final                Failed - GFR or Serum Creatinine in last 6 months     GFR MDRD Non Af Amer   Date Value Ref Range Status   07/29/2019 >60 >60 mL/min/1.73m2 Final     GFR MDRD Af Amer   Date Value Ref Range Status   07/29/2019 >60 >60 mL/min/1.73m2 Final             Failed - Visit with PCP or prescribing provider visit in last 6 months or next 3 months     Last office visit with prescriber/PCP: Visit date not found OR same dept: Visit date not found OR same specialty: 7/29/2019 Víctor Lemus CNP Last physical: Visit date not found Last MTM visit: Visit date not found         Next appt within 3 mo: Visit date not found  Next physical within 3 mo: Visit date not found  Prescriber OR PCP: Jonel Bell MD  Last diagnosis associated with med order: 1. Essential hypertension, benign  - losartan (COZAAR) 50 MG tablet  [Pharmacy Med Name: LOSARTAN POTASSIUM 50 MG TAB]; TAKE 1 TABLET BY MOUTH TWICE A DAY  Dispense: 180 tablet; Refill: 0    2. Type 2 diabetes mellitus without complication, without long-term current use of insulin (H)  - metFORMIN (GLUCOPHAGE) 500 MG tablet [Pharmacy Med Name: METFORMIN  MG TABLET]; TAKE 1 WITH BREAKFAST. TAKE 2 TABLETS WITH SUPPER. - NEED TO MAKE APPT  Dispense: 90 tablet; Refill: 0     If protocol passes may refill for 12 months if within 3 months of last provider visit (or a total of 15 months).           Failed - A1C in last 6 months     Hemoglobin A1c   Date Value Ref Range Status   07/29/2019 6.8 (H) 3.5 - 6.0 % Final               Failed - Microalbumin in last year      Microalbumin, Random Urine   Date Value Ref Range Status   07/29/2019 1.58 0.00 - 1.99 mg/dL Final

## 2021-06-14 NOTE — TELEPHONE ENCOUNTER
RN cannot approve Refill Request    RN can NOT refill this medication Protocol failed and NO refill given. Last office visit: 3/21/2019 Jonel Bell MD Last Physical: 1/23/2019 Last MTM visit: Visit date not found Last visit same specialty: 7/29/2019 Víctor Lemus CNP.  Next visit within 3 mo: Visit date not found  Next physical within 3 mo: Visit date not found      Halle Martinez, [facility] Triage/Med Refill 12/30/2020    Requested Prescriptions   Pending Prescriptions Disp Refills     metFORMIN (GLUCOPHAGE) 500 MG tablet [Pharmacy Med Name: METFORMIN  MG TABLET] 90 tablet 0     Sig: TAKE 1 WITH BREAKFAST. TAKE 2 TABLETS WITH SUPPER. - NEED TO MAKE APPT       Metformin Refill Protocol Failed - 12/28/2020 12:30 PM        Failed - Blood pressure in last 12 months     BP Readings from Last 1 Encounters:   07/29/19 114/77             Failed - LFT or AST or ALT in last 12 months     Albumin   Date Value Ref Range Status   07/29/2019 4.6 3.5 - 5.0 g/dL Final     Bilirubin, Total   Date Value Ref Range Status   07/29/2019 1.4 (H) 0.0 - 1.0 mg/dL Final     Alkaline Phosphatase   Date Value Ref Range Status   07/29/2019 65 45 - 120 U/L Final     AST   Date Value Ref Range Status   07/29/2019 23 0 - 40 U/L Final     ALT   Date Value Ref Range Status   07/29/2019 31 0 - 45 U/L Final     Protein, Total   Date Value Ref Range Status   07/29/2019 7.0 6.0 - 8.0 g/dL Final                Failed - GFR or Serum Creatinine in last 6 months     GFR MDRD Non Af Amer   Date Value Ref Range Status   07/29/2019 >60 >60 mL/min/1.73m2 Final     GFR MDRD Af Amer   Date Value Ref Range Status   07/29/2019 >60 >60 mL/min/1.73m2 Final             Failed - Visit with PCP or prescribing provider visit in last 6 months or next 3 months     Last office visit with prescriber/PCP: Visit date not found OR same dept: Visit date not found OR same specialty: 7/29/2019 Víctor Lemus CNP Last physical: Visit date not found Last MTM  visit: Visit date not found         Next appt within 3 mo: Visit date not found  Next physical within 3 mo: Visit date not found  Prescriber OR PCP: Jonel Bell MD  Last diagnosis associated with med order: 1. Type 2 diabetes mellitus without complication, without long-term current use of insulin (H)  - metFORMIN (GLUCOPHAGE) 500 MG tablet [Pharmacy Med Name: METFORMIN  MG TABLET]; TAKE 1 WITH BREAKFAST. TAKE 2 TABLETS WITH SUPPER. - NEED TO MAKE APPT  Dispense: 90 tablet; Refill: 0     If protocol passes may refill for 12 months if within 3 months of last provider visit (or a total of 15 months).           Failed - A1C in last 6 months     Hemoglobin A1c   Date Value Ref Range Status   07/29/2019 6.8 (H) 3.5 - 6.0 % Final               Failed - Microalbumin in last year      Microalbumin, Random Urine   Date Value Ref Range Status   07/29/2019 1.58 0.00 - 1.99 mg/dL Final

## 2021-06-14 NOTE — TELEPHONE ENCOUNTER
RN cannot approve Refill Request    RN can NOT refill this medication Protocol failed and NO refill given. Last office visit: 3/21/2019 Jonel Bell MD Last Physical: 1/23/2019 Last MTM visit: Visit date not found Last visit same specialty: 7/29/2019 Víctor Lemus CNP.  Next visit within 3 mo: Visit date not found  Next physical within 3 mo: Visit date not found      Halle Martinez, [facility] Triage/Med Refill 1/25/2021    Requested Prescriptions   Pending Prescriptions Disp Refills     metFORMIN (GLUCOPHAGE) 500 MG tablet [Pharmacy Med Name: METFORMIN  MG TABLET] 90 tablet 0     Sig: TAKE 1 WITH BREAKFAST. TAKE 2 TABLETS WITH SUPPER. - NEED TO MAKE APPT       Metformin Refill Protocol Failed - 1/25/2021  7:31 AM        Failed - Blood pressure in last 12 months     BP Readings from Last 1 Encounters:   07/29/19 114/77             Failed - LFT or AST or ALT in last 12 months     Albumin   Date Value Ref Range Status   07/29/2019 4.6 3.5 - 5.0 g/dL Final     Bilirubin, Total   Date Value Ref Range Status   07/29/2019 1.4 (H) 0.0 - 1.0 mg/dL Final     Alkaline Phosphatase   Date Value Ref Range Status   07/29/2019 65 45 - 120 U/L Final     AST   Date Value Ref Range Status   07/29/2019 23 0 - 40 U/L Final     ALT   Date Value Ref Range Status   07/29/2019 31 0 - 45 U/L Final     Protein, Total   Date Value Ref Range Status   07/29/2019 7.0 6.0 - 8.0 g/dL Final                Failed - GFR or Serum Creatinine in last 6 months     GFR MDRD Non Af Amer   Date Value Ref Range Status   07/29/2019 >60 >60 mL/min/1.73m2 Final     GFR MDRD Af Amer   Date Value Ref Range Status   07/29/2019 >60 >60 mL/min/1.73m2 Final             Failed - Visit with PCP or prescribing provider visit in last 6 months or next 3 months     Last office visit with prescriber/PCP: Visit date not found OR same dept: Visit date not found OR same specialty: 7/29/2019 Víctor Lemus CNP Last physical: Visit date not found Last MTM  visit: Visit date not found         Next appt within 3 mo: Visit date not found  Next physical within 3 mo: Visit date not found  Prescriber OR PCP: Jonel Bell MD  Last diagnosis associated with med order: 1. Type 2 diabetes mellitus without complication, without long-term current use of insulin (H)  - metFORMIN (GLUCOPHAGE) 500 MG tablet [Pharmacy Med Name: METFORMIN  MG TABLET]; TAKE 1 WITH BREAKFAST. TAKE 2 TABLETS WITH SUPPER. - NEED TO MAKE APPT  Dispense: 90 tablet; Refill: 0     If protocol passes may refill for 12 months if within 3 months of last provider visit (or a total of 15 months).           Failed - A1C in last 6 months     Hemoglobin A1c   Date Value Ref Range Status   07/29/2019 6.8 (H) 3.5 - 6.0 % Final               Failed - Microalbumin in last year      Microalbumin, Random Urine   Date Value Ref Range Status   07/29/2019 1.58 0.00 - 1.99 mg/dL Final

## 2021-06-15 NOTE — TELEPHONE ENCOUNTER
RN cannot approve Refill Request    RN can NOT refill this medication PCP messaged that patient is overdue for Labs. Last office visit: 3/21/2019 Jonel Bell MD Last Physical: 1/23/2019 Last MTM visit: Visit date not found Last visit same specialty: 7/29/2019 Víctor Lemus CNP.  Next visit within 3 mo: Visit date not found  Next physical within 3 mo: Visit date not found      Magdalena Otto, [facility] Triage/Med Refill 3/15/2021    Requested Prescriptions   Pending Prescriptions Disp Refills     metFORMIN (GLUCOPHAGE) 500 MG tablet [Pharmacy Med Name: METFORMIN  MG TABLET] 270 tablet 0     Sig: TAKE 1 TABLET BY MOUTH WITH BREAKFAST. TAKE 2 TABLETS WITH SUPPER.       Metformin Refill Protocol Failed - 3/15/2021  1:16 PM        Failed - Blood pressure in last 12 months     BP Readings from Last 1 Encounters:   07/29/19 114/77             Failed - LFT or AST or ALT in last 12 months     Albumin   Date Value Ref Range Status   07/29/2019 4.6 3.5 - 5.0 g/dL Final     Bilirubin, Total   Date Value Ref Range Status   07/29/2019 1.4 (H) 0.0 - 1.0 mg/dL Final     Alkaline Phosphatase   Date Value Ref Range Status   07/29/2019 65 45 - 120 U/L Final     AST   Date Value Ref Range Status   07/29/2019 23 0 - 40 U/L Final     ALT   Date Value Ref Range Status   07/29/2019 31 0 - 45 U/L Final     Protein, Total   Date Value Ref Range Status   07/29/2019 7.0 6.0 - 8.0 g/dL Final                Failed - GFR or Serum Creatinine in last 6 months     GFR MDRD Non Af Amer   Date Value Ref Range Status   07/29/2019 >60 >60 mL/min/1.73m2 Final     GFR MDRD Af Amer   Date Value Ref Range Status   07/29/2019 >60 >60 mL/min/1.73m2 Final             Failed - Visit with PCP or prescribing provider visit in last 6 months or next 3 months     Last office visit with prescriber/PCP: Visit date not found OR same dept: Visit date not found OR same specialty: 7/29/2019 Víctor Lemus CNP Last physical: Visit date not found  Last MTM visit: Visit date not found         Next appt within 3 mo: Visit date not found  Next physical within 3 mo: Visit date not found  Prescriber OR PCP: Jonel Bell MD  Last diagnosis associated with med order: 1. Type 2 diabetes mellitus without complication, without long-term current use of insulin (H)  - metFORMIN (GLUCOPHAGE) 500 MG tablet [Pharmacy Med Name: METFORMIN  MG TABLET]; TAKE 1 TABLET BY MOUTH WITH BREAKFAST. TAKE 2 TABLETS WITH SUPPER.  Dispense: 270 tablet; Refill: 0     If protocol passes may refill for 12 months if within 3 months of last provider visit (or a total of 15 months).           Failed - A1C in last 6 months     Hemoglobin A1c   Date Value Ref Range Status   07/29/2019 6.8 (H) 3.5 - 6.0 % Final               Failed - Microalbumin in last year      Microalbumin, Random Urine   Date Value Ref Range Status   07/29/2019 1.58 0.00 - 1.99 mg/dL Final

## 2021-06-15 NOTE — TELEPHONE ENCOUNTER
RN cannot approve Refill Request    RN can NOT refill this medication PCP messaged that patient is overdue for Labs, Office Visit and BP. Last office visit: 3/21/2019 Jonel Bell MD Last Physical: 1/23/2019 Last MTM visit: Visit date not found Last visit same specialty: 7/29/2019 Víctor Lemus CNP.  Next visit within 3 mo: Visit date not found  Next physical within 3 mo: Visit date not found      Barbara Watson, [facility] Triage/Med Refill 2/23/2021    Requested Prescriptions   Pending Prescriptions Disp Refills     metFORMIN (GLUCOPHAGE) 500 MG tablet [Pharmacy Med Name: METFORMIN  MG TABLET] 90 tablet 0     Sig: TAKE 1 WITH BREAKFAST. TAKE 2 TABLETS WITH SUPPER. - NEED TO MAKE APPT       Metformin Refill Protocol Failed - 2/23/2021 10:32 AM        Failed - Blood pressure in last 12 months     BP Readings from Last 1 Encounters:   07/29/19 114/77             Failed - LFT or AST or ALT in last 12 months     Albumin   Date Value Ref Range Status   07/29/2019 4.6 3.5 - 5.0 g/dL Final     Bilirubin, Total   Date Value Ref Range Status   07/29/2019 1.4 (H) 0.0 - 1.0 mg/dL Final     Alkaline Phosphatase   Date Value Ref Range Status   07/29/2019 65 45 - 120 U/L Final     AST   Date Value Ref Range Status   07/29/2019 23 0 - 40 U/L Final     ALT   Date Value Ref Range Status   07/29/2019 31 0 - 45 U/L Final     Protein, Total   Date Value Ref Range Status   07/29/2019 7.0 6.0 - 8.0 g/dL Final                Failed - GFR or Serum Creatinine in last 6 months     GFR MDRD Non Af Amer   Date Value Ref Range Status   07/29/2019 >60 >60 mL/min/1.73m2 Final     GFR MDRD Af Amer   Date Value Ref Range Status   07/29/2019 >60 >60 mL/min/1.73m2 Final             Failed - Visit with PCP or prescribing provider visit in last 6 months or next 3 months     Last office visit with prescriber/PCP: Visit date not found OR same dept: Visit date not found OR same specialty: 7/29/2019 Víctor Lemus CNP Last  physical: Visit date not found Last MTM visit: Visit date not found         Next appt within 3 mo: Visit date not found  Next physical within 3 mo: Visit date not found  Prescriber OR PCP: Jonel Bell MD  Last diagnosis associated with med order: 1. Type 2 diabetes mellitus without complication, without long-term current use of insulin (H)  - metFORMIN (GLUCOPHAGE) 500 MG tablet [Pharmacy Med Name: METFORMIN  MG TABLET]; TAKE 1 WITH BREAKFAST. TAKE 2 TABLETS WITH SUPPER. - NEED TO MAKE APPT  Dispense: 90 tablet; Refill: 0     If protocol passes may refill for 12 months if within 3 months of last provider visit (or a total of 15 months).           Failed - A1C in last 6 months     Hemoglobin A1c   Date Value Ref Range Status   07/29/2019 6.8 (H) 3.5 - 6.0 % Final               Failed - Microalbumin in last year      Microalbumin, Random Urine   Date Value Ref Range Status   07/29/2019 1.58 0.00 - 1.99 mg/dL Final

## 2021-06-15 NOTE — TELEPHONE ENCOUNTER
RN cannot approve Refill Request    RN can NOT refill this medication Protocol failed and NO refill given. Last office visit: 3/21/2019 Jonel Bell MD Last Physical: 1/23/2019 Last MTM visit: Visit date not found Last visit same specialty: 7/29/2019 Víctor Lemus CNP.  Next visit within 3 mo: Visit date not found  Next physical within 3 mo: Visit date not found      Khoa N[place], [facility] Triage/Med Refill 3/12/2021    Requested Prescriptions   Pending Prescriptions Disp Refills     losartan (COZAAR) 50 MG tablet [Pharmacy Med Name: LOSARTAN POTASSIUM 50 MG TAB] 180 tablet 0     Sig: TAKE 1 TABLET BY MOUTH TWICE A DAY       Angiotensin Receptor Blocker Protocol Failed - 3/12/2021 12:12 AM        Failed - PCP or prescribing provider visit in past 12 months       Last office visit with prescriber/PCP: 3/21/2019 Jonel Bell MD OR same dept: Visit date not found OR same specialty: 7/29/2019 Víctor Lemus CNP  Last physical: 1/23/2019 Last MTM visit: Visit date not found   Next visit within 3 mo: Visit date not found  Next physical within 3 mo: Visit date not found  Prescriber OR PCP: Jonel Bell MD  Last diagnosis associated with med order: 1. Essential hypertension, benign  - losartan (COZAAR) 50 MG tablet [Pharmacy Med Name: LOSARTAN POTASSIUM 50 MG TAB]; TAKE 1 TABLET BY MOUTH TWICE A DAY  Dispense: 180 tablet; Refill: 0    If protocol passes may refill for 12 months if within 3 months of last provider visit (or a total of 15 months).             Failed - Serum potassium within the past 12 months     No results found for: LN-POTASSIUM          Failed - Blood pressure filed in past 12 months     BP Readings from Last 1 Encounters:   07/29/19 114/77             Failed - Serum creatinine within the past 12 months     Creatinine   Date Value Ref Range Status   07/29/2019 1.16 0.70 - 1.30 mg/dL Final

## 2021-06-16 PROBLEM — J32.9 CHRONIC SINUSITIS, UNSPECIFIED LOCATION: Status: ACTIVE | Noted: 2019-02-07

## 2021-06-16 PROBLEM — K76.0 FATTY LIVER: Status: ACTIVE | Noted: 2019-02-07

## 2021-06-16 PROBLEM — K40.90 LEFT INGUINAL HERNIA: Status: ACTIVE | Noted: 2019-02-07

## 2021-06-16 PROBLEM — R97.20 ELEVATED PROSTATE SPECIFIC ANTIGEN (PSA): Status: ACTIVE | Noted: 2019-02-07

## 2021-06-16 PROBLEM — E11.9 TYPE 2 DIABETES MELLITUS WITHOUT COMPLICATION, WITHOUT LONG-TERM CURRENT USE OF INSULIN (H): Status: ACTIVE | Noted: 2019-02-07

## 2021-06-16 PROBLEM — Z80.0 FAMILY HISTORY OF COLON CANCER IN MOTHER: Status: ACTIVE | Noted: 2019-02-07

## 2021-06-16 PROBLEM — E78.00 HYPERCHOLESTEROLEMIA: Status: ACTIVE | Noted: 2019-02-07

## 2021-06-16 PROBLEM — I10 ESSENTIAL HYPERTENSION, BENIGN: Status: ACTIVE | Noted: 2019-02-07

## 2021-06-18 NOTE — LETTER
Letter by Jonel Bell MD at      Author: Jonel Bell MD Service: -- Author Type: --    Filed:  Encounter Date: 2/8/2019 Status: (Other)       Josh BRINK Jenskae  [address] [place] [address]             February 8, 2019         Dear Mr. Dent,    Below are the results from your recent visit:    Resulted Orders   Comprehensive Metabolic Panel   Result Value Ref Range    Sodium 138 136 - 145 mmol/L    Potassium 4.6 3.5 - 5.0 mmol/L    Chloride 104 98 - 107 mmol/L    CO2 26 22 - 31 mmol/L    Anion Gap, Calculation 8 5 - 18 mmol/L    Glucose 274 (H) 70 - 125 mg/dL    BUN 15 8 - 22 mg/dL    Creatinine 1.04 0.70 - 1.30 mg/dL    GFR MDRD Af Amer >60 >60 mL/min/1.73m2    GFR MDRD Non Af Amer >60 >60 mL/min/1.73m2    Bilirubin, Total 1.2 (H) 0.0 - 1.0 mg/dL    Calcium 9.7 8.5 - 10.5 mg/dL    Protein, Total 6.6 6.0 - 8.0 g/dL    Albumin 4.2 3.5 - 5.0 g/dL    Alkaline Phosphatase 82 45 - 120 U/L    AST 23 0 - 40 U/L    ALT 41 0 - 45 U/L    Narrative    Fasting Glucose reference range is 70-99 mg/dL per  American Diabetes Association (ADA) guidelines.       Kidney labs and potassium level normal.  Increase losartan as planned.    Blood sugars are still very high, we will discuss that next visit.    Bilirubin level okay.  Other liver tests, including ALT, are now normal.    No change in treatment plan.    Please call with questions or contact us using [employer].    Sincerely,        Electronically signed by Jonel Bell MD

## 2021-06-18 NOTE — LETTER
Letter by Jonel Bell MD at      Author: Jonel Bell MD Service: -- Author Type: --    Filed:  Encounter Date: 1/24/2019 Status: (Other)       Josh BRINK Filipe  [address] [place] [address]             January 24, 2019         Dear Mr. Dent,    Below are the results from your recent visit:    Resulted Orders   Comprehensive Metabolic Panel   Result Value Ref Range    Sodium 139 136 - 145 mmol/L    Potassium 3.9 3.5 - 5.0 mmol/L    Chloride 103 98 - 107 mmol/L    CO2 22 22 - 31 mmol/L    Anion Gap, Calculation 14 5 - 18 mmol/L    Glucose 271 (H) 70 - 125 mg/dL    BUN 16 8 - 22 mg/dL    Creatinine 0.98 0.70 - 1.30 mg/dL    GFR MDRD Af Amer >60 >60 mL/min/1.73m2    GFR MDRD Non Af Amer >60 >60 mL/min/1.73m2    Bilirubin, Total 0.7 0.0 - 1.0 mg/dL    Calcium 9.5 8.5 - 10.5 mg/dL    Protein, Total 7.0 6.0 - 8.0 g/dL    Albumin 4.4 3.5 - 5.0 g/dL    Alkaline Phosphatase 100 45 - 120 U/L    AST 24 0 - 40 U/L    ALT 49 (H) 0 - 45 U/L    Narrative    Fasting Glucose reference range is 70-99 mg/dL per  American Diabetes Association (ADA) guidelines.   Glycosylated Hemoglobin A1c   Result Value Ref Range    Hemoglobin A1c 13.7 (H) 3.5 - 6.0 %   LDL Cholesterol, Direct   Result Value Ref Range    Direct  (H) <=129 mg/dl   PSA (Prostatic-Specific Antigen), Annual Screen   Result Value Ref Range    PSA 4.3 (H) 0.0 - 3.5 ng/mL    Narrative    Method is Abbott Prostate-Specific Antigen (PSA)  Standard-WHO 1st International (90:10)   HM2(CBC w/o Differential)   Result Value Ref Range    WBC 6.3 4.0 - 11.0 thou/uL    RBC 5.39 4.40 - 6.20 mill/uL    Hemoglobin 16.2 14.0 - 18.0 g/dL    Hematocrit 48.2 40.0 - 54.0 %    MCV 90 80 - 100 fL    MCH 30.1 27.0 - 34.0 pg    MCHC 33.6 32.0 - 36.0 g/dL    RDW 12.4 11.0 - 14.5 %    Platelets 195 140 - 440 thou/uL    MPV 8.7 7.0 - 10.0 fL   Thyroid Stimulating Hormone (TSH)   Result Value Ref Range    TSH 1.31 0.30 - 5.00 uIU/mL       Diabetes is poorly controlled with  hemoglobin A1c of 13.7.  Start metformin as planned.    Kidney labs are normal.  Start losartan as planned.    Borderline ALT consistent with fatty liver.    Moderately elevated LDL cholesterol.  We can discuss your options for cholesterol medication in the future.    Hemoglobin and blood counts are normal.    Thyroid test is normal.    PSA level is mildly elevated, but of unclear significance.  I will plan to have your PSA rechecked at future visit.    Please call with questions or contact us using [employer].    Sincerely,        Electronically signed by Jonel Bell MD

## 2021-06-19 NOTE — LETTER
Letter by Jonel Bell MD at      Author: Jonel Bell MD Service: -- Author Type: --    Filed:  Encounter Date: 3/22/2019 Status: (Other)         Josh Dent  [address] [place] [address]             March 22, 2019         Dear Mr. Dent,    Below are the results from your recent visit:    Resulted Orders   Basic Metabolic Panel   Result Value Ref Range    Sodium 141 136 - 145 mmol/L    Potassium 3.9 3.5 - 5.0 mmol/L    Chloride 108 (H) 98 - 107 mmol/L    CO2 20 (L) 22 - 31 mmol/L    Anion Gap, Calculation 13 5 - 18 mmol/L    Glucose 112 70 - 125 mg/dL    Calcium 9.3 8.5 - 10.5 mg/dL    BUN 16 8 - 22 mg/dL    Creatinine 0.92 0.70 - 1.30 mg/dL    GFR MDRD Af Amer >60 >60 mL/min/1.73m2    GFR MDRD Non Af Amer >60 >60 mL/min/1.73m2    Narrative    Fasting Glucose reference range is 70-99 mg/dL per  American Diabetes Association (ADA) guidelines.   PSA (Prostatic-Specific Antigen), Diagnostic   Result Value Ref Range    PSA 4.7 (H) 0.0 - 3.5 ng/mL    Narrative    Method is Abbott Prostate-Specific Antigen (PSA)  Standard-WHO 1st International (90:10)       Kidney labs are normal.  Potassium level is normal.  Blood sugar is normal!    PSA with minimal change from previous check.  It is only mildly elevated.  I would plan to recheck your PSA at future visit.    Please call with questions or contact us using [employer].    Sincerely,        Electronically signed by Jonel Bell MD

## 2021-06-23 NOTE — PATIENT INSTRUCTIONS - HE
Lab work today.  Results will be mailed to you.    Start losartan 50 mg once a day.  Goal blood pressure less than 130/80.  If you do have significant lightheaded dizzy spells or blood pressures less than 110/60, you will need to cut that medication in half for only 25 mg a day.    Follow-up with me in 2 weeks to reevaluate blood pressure.    Do not take ibuprofen or Aleve type medication.    Assuming your hemoglobin A1c is above 7.5%, I would have you start metformin 500 mg twice a day.  If you do develop mild nausea or loose stools, continue to take metformin as most people do get used to it.    Work hard on diabetic diet and regular exercise.  At least 20 minutes of aerobic type exercise 3 times a week or more is recommended.    You can request a diabetic educator visit to review diet and your glucometer, if needed.    Continue to see ophthalmology every 6 months, make appointment this winter.    You can use a Roxann pot saline irrigation for your chronic nasal congestion.

## 2021-06-23 NOTE — PROGRESS NOTES
[facility] clinic Follow Up Note    Josh Dent   58 y.o. male    Date of Visit: 1/23/2019    Chief Complaint   Patient presents with     Annual Exam     Subjective  Audie is here to establish care and health maintenance physical exam.    He also had significant new issues of uncontrolled diabetes and hypertension.    Patient was diagnosed with diet-controlled diabetes back in 2008 but did not want to take medication.  Last hemoglobin A1c in 2009 was 7.2%.  Uses over-the-counter glucometer occasionally and blood sugars are running between 100- 180.  No polyuria or polydipsia.  No foot numbness or sores.    He sees the ophthalmologist about every 6 months, last seen in July and no retinopathy.    His mother had glaucoma and patient has borderline elevated eye pressures, therefore gets seen every 6 months.  No acute change in vision and no headache complaints now.    He does have chronic sinusitis, but no fever or purulent drainage.    In 2017 he was in the emergency room for some abdominal pain.  He had gallstones noted at the time but otherwise CT scan was negative.  His blood sugar was 339 at that time.    Creatinine was 1.0.  ALT 52.  Normal EKG.    He has not had any recurrent abdominal pain or biliary colic type symptoms.    His bowels are normal.  No blood in stool or melena.    He had a colonoscopy April 2015 with a 3 mm polyp that was hyperplastic.  No history of adenomatous polyps.  His mother may have had colon cancer, and he is on a 5-year follow-up plan.    1-2 times a night nocturia.  No dysuria or hematuria.    No history of sleep apnea or daytime sleepiness.  Some mild fatigue.  Denies any significant alcohol.  He does not snore.    No palpitations or history of arrhythmia.    No exertional type chest pain or history of cardiac disease.    He has some pectoral type muscular pain occasionally but he states it never last more than a minute and if he leans back and stretches it goes away  "right away.  No exertional increased dyspnea on exertion.    He has been using the exercise bike in a rowing machine every other day over the past month.  No exertional symptoms.    The patient is , his daughter has grown and left.    He admits to not following a strict diet, often binge eating or stress eating.    Past history of hypertension.  He had been on lisinopril in the past but developed a cough.  He had a reaction to Avalide back in 2009 but does not remember the reaction.  He has not been on a blood pressure medicine for many years.  He checks blood pressure at home intermittently, blood pressures have been ranging between 120/80 and 140s over 90s.  It usually around 140/90.  No lower extremity edema.    No history of skin cancer.  He has had some cyst removed and some noncancerous growths removed in the past.  He does still see dermatology regularly.    His father had type 2 diabetes and hypertension.  His mother had hypertension and glaucoma.    He is never smoked.    Back in 2009 his LDL was 99 and HDL 47.  He has not been on a statin drug in the past.    CT scan of the abdomen in 2017 did show some small fatty liver, no AAA.    Works as a .  He does report stressful job situation.    PMHx:  No past medical history on file.  PSHx:  History reviewed. No pertinent surgical history.  Immunizations:   Immunization History   Administered Date(s) Administered     INFLUENZA,RECOMBINANT,INJ,PF QUADRIVALENT 18+YRS 01/23/2019       ROS A comprehensive review of systems was performed and was otherwise negative    Medications, allergies, and problem list were reviewed and updated    Exam  BP (!) 158/100 (Patient Site: Right Arm, Patient Position: Sitting, Cuff Size: Adult Large)   Pulse 72   Ht 5' 10\" (1.778 m)   Wt (!) 236 lb 6.4 oz (107.2 kg)   BMI 33.92 kg/m    Moderately obese male.  Alert and oriented x3 with normal mood pupils and irises normal.  Extraocular muscles normal.  No " jaundice or conjunctivitis.  External ears and nose exam is normal, tympanic membranes normal.  Pharynx is not crowded, no pharyngitis or thrush.  No leukoplakia.  Teeth in good condition.  No cervical or cervical adenopathy.  No JVD and no carotid bruits.  No thyromegaly or nodularity.  Lungs are clear to auscultation with normal respiratory excursion.  Heart is regular no murmur rub or gallop.  +1 pedal pulses and no ankle edema.  Feet are in good condition.  Fine filament sensation intact.  Skin is normal.  No pre-ulcerative calluses.  Abdomen is moderately obese.  No significant tenderness.  No pulsatile mass.  Liver edge is right at the costal margin and smooth.  No right upper quadrant tenderness.  No spinal megaly.  Testicles are moderately atrophic.  A small spontaneously reducible left inguinal hernia noted.  He declined a rectal exam.    His skin exam shows some scars on back, no suspicious type lesions.  Gait within normal limits.  Muscle skeletal exam otherwise normal.    Assessment/Plan  1. Routine health maintenance  His main issue is diabetes and hypertension not being controlled.    He denies exertional symptoms, but I did speak to him about how symptoms are not always classic especially in diabetics.  If he has any worsening dyspnea on exertion or chest type symptoms, patient was told I would have him undergo cardiac evaluation with stress testing.  He will seek medical attention immediately if new symptoms develop.    Family history of glaucoma and diabetes, continue every 6-month eye exams, due next month.  He will make an appointment.      - Influenza, Recombinant, Inj, Quadrivalent, PF, 18+YRS    2. Type 2 diabetes mellitus without complication, without long-term current use of insulin (H)  Not controlled.  Start metformin.  I did discuss GI side effects from metformin.    We will discuss statin drug at future visit.    He declined referral to the diabetic educator.  I counseled him on reducing  simple carbohydrates and starchy food in diet.  Increase regularity of exercise.    - Comprehensive Metabolic Panel  - Glycosylated Hemoglobin A1c  - LDL Cholesterol, Direct  - HM2(CBC w/o Differential)  - metFORMIN (GLUCOPHAGE) 500 MG tablet; Take 1 tablet (500 mg total) by mouth 2 (two) times a day with meals.  Dispense: 60 tablet; Refill: 3    He did not want to get a new glucometer, but I did offer that.    I discussed GLP-1 agonists as an option for the future if diabetes remains sub-adequately controlled on metformin.    3. Essential hypertension, benign  Not controlled.  I discussed risk of losartan including affecting kidney function, kidney injury, high potassium, low blood pressure with syncope risk and other risks.    Follow-up in 2 weeks to reevaluate blood pressure.  Patient was told to avoid NSAIDs.  - losartan (COZAAR) 50 MG tablet; Take 1 tablet (50 mg total) by mouth daily.  Dispense: 30 tablet; Refill: 3    4. Screening for prostate cancer    - PSA (Prostatic-Specific Antigen), Annual Screen    5. Fatigue, unspecified type  Mild.  He denies sleep apnea symptoms and does not have significant neck adiposity or pharyngeal crowding.  I would not suspect sleep apnea.  - Thyroid Stimulating Hormone (TSH)    Family history of colon cancer in mother.  5 your colonoscopy due April 2020.    Chronic sinusitis with postnasal drip type cough.  He will try a Roxann pot.  I would not have him start Flonase unless his diabetes is well controlled.    Small left inguinal hernia, asymptomatic    History of cholelithiasis, currently asymptomatic.    History of fatty liver, checking liver test today.  Work on weight loss and diabetes control.    Return in about 2 weeks (around 2/6/2019) for Recheck.   Patient Instructions   Lab work today.  Results will be mailed to you.    Start losartan 50 mg once a day.  Goal blood pressure less than 130/80.  If you do have significant lightheaded dizzy spells or blood pressures  less than 110/60, you will need to cut that medication in half for only 25 mg a day.    Follow-up with me in 2 weeks to reevaluate blood pressure.    Do not take ibuprofen or Aleve type medication.    Assuming your hemoglobin A1c is above 7.5%, I would have you start metformin 500 mg twice a day.  If you do develop mild nausea or loose stools, continue to take metformin as most people do get used to it.    Work hard on diabetic diet and regular exercise.  At least 20 minutes of aerobic type exercise 3 times a week or more is recommended.    You can request a diabetic educator visit to review diet and your glucometer, if needed.    Continue to see ophthalmology every 6 months, make appointment this winter.    You can use a Roxann pot saline irrigation for your chronic nasal congestion.        Jonel Bell MD        Current Outpatient Medications   Medication Sig Dispense Refill     ascorbate calcium (VITAMIN C ORAL) Take 1 tablet by mouth daily.       BIOTIN ORAL Take 1 tablet by mouth once a week.       cholecalciferol, vitamin D3, (VITAMIN D3 ORAL) Take 2,000 Units by mouth daily. Not taken during summer.       chromium picolin,hist/minerals (CHROMIUM PICOLINAT,HISTID-MINS ORAL) Take 1 tablet by mouth once a week.       GYMNEMA LEAF, BULK, MISC Use 1 capsule As Directed once a week.       MULTIVITAMIN ORAL Take 1 tablet by mouth daily.       vitamin E acetate (VITAMIN E ORAL) Take 1 tablet by mouth daily.       blood glucose test (GLUCOSE BLOOD) strips 2 (two) times a day.       losartan (COZAAR) 50 MG tablet Take 1 tablet (50 mg total) by mouth daily. 30 tablet 3     metFORMIN (GLUCOPHAGE) 500 MG tablet Take 1 tablet (500 mg total) by mouth 2 (two) times a day with meals. 60 tablet 3     No current facility-administered medications for this visit.      No Known Allergies  Social History     Tobacco Use     Smoking status: Never Smoker     Smokeless tobacco: Never Used   Substance Use Topics     Alcohol use: Yes      Alcohol/week: 0.0 - 0.6 oz     Drug use: Not on file

## 2021-06-23 NOTE — PROGRESS NOTES
[facility] clinic Follow Up Note    Josh Dent   58 y.o. male    Date of Visit: 2/7/2019    Chief Complaint   Patient presents with     2 week follow-up     Blood pressure     Subjective  Josh is here with his wife, Munira.  Here for follow-up of multiple medical issues.    At initial visit on January 23 his diabetes and hypertension was out of control and not on medicines.    Last month his hemoglobin A1c was 13.7.  He is obese and was not following a diet.  History of binge eating and emotional eating.  Patient has been controlling his diabetes in the past with diet.  He did not want to see the diabetic educator.    He is increasing his regular exercise.  Does walking, biking and rowing machine.    Lately he has been shoveling more.    He does feel he tolerates this activity.  He denies any increasing shortness of breath.  He did report again another episode of the left pectoral sharp pain while shoveling, but he just stated it lasted a few seconds and resolved with temporary rest.  He has not had substernal type chest pain and no chest pain more than a minute.  He did not want to proceed with a stress test or further evaluation at this time.    No palpitations or history of arrhythmia.    No lower extremity edema.    He denies sleep apnea and denies significant alcohol.    Both parents had hypertension.  Father had diabetes.    Blood pressure has been in the 140/90 range, but otherwise tolerated losartan 50 mg a day well which was started on January 23.  He had a cough in the past with lisinopril.    He had some mild nausea when first starting on metformin but that resolved.  Compliant with 500 mg twice a day.    He did not want to get a new glucometer.    Last month his LDL cholesterol is 147, he is very resistant to using a statin.  He states his father had severe myalgias and did not tolerate statins well.    Last month his ALT was 49.  2017 abdominal CT scan did show fatty liver but no  AAA.    He is never smoked.    He does have chronic sinusitis, no acute worsening.  No fever.  He plans to start Roxann pot but has not done so yet.  He was told not to start Flonase because of his uncontrolled diabetes.    I reviewed his lab work from last month with PSA of 4.3.  No new urinary symptoms.    Past history of gallstone, no upper abdominal pain.    He did start aspirin 81 mg a day and wishes to continue on that.  I discussed bleeding risk with aspirin, but he feels that is acceptable risk to him.    April 2015 colonoscopy with hyperplastic polyp with his mother had colon cancer.  5-year follow-up plan.    Mother had glaucoma.  He is due for his 6-month ophthalmology appointment but has not made appointment yet.    No symptoms from his small left inguinal hernia     at home with wife, grown daughter    PMHx:  No past medical history on file.  PSHx:  No past surgical history on file.  Immunizations:   Immunization History   Administered Date(s) Administered     INFLUENZA,RECOMBINANT,INJ,PF QUADRIVALENT 18+YRS 01/23/2019       ROS A comprehensive review of systems was performed and was otherwise negative    Medications, allergies, and problem list were reviewed and updated    Exam  BP (!) 146/94 (Patient Site: Right Arm, Patient Position: Sitting, Cuff Size: Adult Large)   Pulse 72   Wt (!) 235 lb 14.4 oz (107 kg)   BMI 33.85 kg/m    Alert and oriented x3 in no jaundice.  Lungs are clear.  Heart is regular without murmur.  Abdomen is obese but nontender.  Liver edge is 1 cm below costal margin.  No ankle edema.    Assessment/Plan  1. Essential hypertension, benign  Not controlled.  Increase losartan and follow-up in 2-3 weeks.  He was warned about low blood pressure risk, syncope risk, also risk of high potassium and affecting kidney function.    He was warned to avoid NSAIDs, because of interaction risk.      - losartan (COZAAR) 50 MG tablet; Take 1 tablet (50 mg total) by mouth 2 (two) times a  day.  Dispense: 60 tablet; Refill: 3    2. Type 2 diabetes mellitus without complication, without long-term current use of insulin (H)  Continue on metformin 500 mg twice daily.  I will discuss increasing metformin at next visit if he continues to tolerate it.    Plan to maximize metformin at 1000 mg twice a day, then see if GLP-1 agonist would be indicated.  - aspirin 81 MG EC tablet; Take 1 tablet (81 mg total) by mouth daily.; Refill: 0    3. Elevated prostate specific antigen (PSA)  Recheck PSA at next visit.  Continue to increase of PSA, could consider referral to urology at that time.    4. Hypercholesterolemia  Patient states he has a family history of intolerance of statins.  He is nervous about starting a statin.  I did discuss there is toxicity risk with muscle and liver and other toxicity risk with statin, but also significant benefit given his risk factors.    Once his blood pressure and diabetes gets under better control, I will plan to have him start Lipitor, or possibly pravastatin if he does have sniffing and muscle achiness.    I discussed Repatha type medication option, but likely that would not be covered by his insurance.    I discussed Zetia option, but lack of good efficacy data.      5. Fatty liver  Weight loss and exercise, same plan is for diabetes    6. Family history of colon cancer in mother  5 your colonoscopy due April 2020    7. Chronic sinusitis, unspecified location  No flare at this time.  Try a Roxann pot.  No Flonase at this time until diabetes is controlled.    If worsening symptoms or fever develops, get seen immediately for evaluation for antibiotic.    8. Left inguinal hernia  Asymptomatic    9. Medication monitoring encounter    - Comprehensive Metabolic Panel    Cholelithiasis asymptomatic    Family history of glaucoma with mother, see ophthalmology this winter, needs to make an appointment.    Return in about 3 weeks (around 2/28/2019) for Recheck.   Patient Instructions    Increase losartan to 50 mg twice a day.    Continue on metformin at the same dose at this time, we will discuss increasing the dose at next visit.    Continue to work on your diet and exercise plan.    We will discuss statin medication options in the future.    We will recheck your PSA level at future visit.    Checking kidney and liver test today.    Make your routine ophthalmology appointment.    Seek medical attention immediately if you have any chest pain lasting more than 5 minutes.    Continue aspirin 81 mg a day.    Jonel Bell MD  I spent a total time with patient of over 25 minutes and over 50% coord care.  Time all face to face.      Current Outpatient Medications   Medication Sig Dispense Refill     ascorbate calcium (VITAMIN C ORAL) Take 1 tablet by mouth daily.       b complex vitamins (B COMPLEX-VITAMIN B12) tablet Take 1 tablet by mouth 3 (three) times a week.       BIOTIN ORAL Take 1 tablet by mouth once a week.       blood glucose test (GLUCOSE BLOOD) strips 2 (two) times a day.       cholecalciferol, vitamin D3, (VITAMIN D3 ORAL) Take 2,000 Units by mouth daily. Not taken during summer.       chromium picolin,hist/minerals (CHROMIUM PICOLINAT,HISTID-MINS ORAL) Take 1 tablet by mouth once a week.       GYMNEMA LEAF, BULK, MISC Use 1 capsule As Directed once a week.       losartan (COZAAR) 50 MG tablet Take 1 tablet (50 mg total) by mouth 2 (two) times a day. 60 tablet 3     metFORMIN (GLUCOPHAGE) 500 MG tablet Take 1 tablet (500 mg total) by mouth 2 (two) times a day with meals. 60 tablet 3     VANADYL SULFATE, BULK, MISC Use 1 tablet As Directed 3 (three) times a week.       vitamin E acetate (VITAMIN E ORAL) Take 1 tablet by mouth daily.       aspirin 81 MG EC tablet Take 1 tablet (81 mg total) by mouth daily.  0     MULTIVITAMIN ORAL Take 1 tablet by mouth daily.       No current facility-administered medications for this visit.      No Known Allergies  Social History     Tobacco Use      Smoking status: Never Smoker     Smokeless tobacco: Never Used   Substance Use Topics     Alcohol use: Yes     Alcohol/week: 0.0 - 0.6 oz     Drug use: Not on file

## 2021-06-23 NOTE — PATIENT INSTRUCTIONS - HE
Increase losartan to 50 mg twice a day.    Continue on metformin at the same dose at this time, we will discuss increasing the dose at next visit.    Continue to work on your diet and exercise plan.    We will discuss statin medication options in the future.    We will recheck your PSA level at future visit.    Checking kidney and liver test today.    Make your routine ophthalmology appointment.    Seek medical attention immediately if you have any chest pain lasting more than 5 minutes.    Continue aspirin 81 mg a day.

## 2021-06-25 NOTE — PROGRESS NOTES
[facility] clinic Follow Up Note    Josh Dent   58 y.o. male    Date of Visit: 3/21/2019    Chief Complaint   Patient presents with     6 week follow-up     Hypertension and diabetes recheck. (Last visit: 02/17/19.)     Jeri Bronson is here with his wife, Munira.  Patient is here for follow-up of hypertension and diabetes.    Last month his blood pressure was 146/94 and I had him increase the losartan to 50 mg twice a day.  He tolerated that well and no orthostasis.  His blood pressures at home have been variable in the 120s-140s over 80s but sometimes spiking up to 150s over 90 with stress.    No lower extremity edema.  Creatinine and potassium were normal last month.    Both his parents had hypertension.    His father had diabetes.    January 2019 hemoglobin A1c was 13.9%.  History of binge eating, poor diet.    Is made significant improvements in his diet.  He is active on a daily basis now doing walking and rolling and biking.    Good exertional ability.  No increasing shortness of breath or chest pains or palpitations.    He is tolerating metformin 500 mg twice a day without GI side effects.  No foot sores or numbness.    He did see ophthalmology recently and no retinopathy but he did have some questionable intraocular pressures and has a referral to a ophthalmologist next week.    His father had severe myalgias with statins and statin intolerance.  Patient does not try to statin in the past.  January 2019 .    No flare of his sinusitis.    Past history of fatty liver with borderline ALT previously.  No AAA seen on 2007 CT scan.    He never smoked.    April 2015 colonoscopy with hyperplastic polyp only.  Mother had colon cancer and 5-year follow-up plan.    His urinary frequency symptoms have improved quite a bit.  This is during his improvements of blood sugars.    Elevated PSA in January of 4.3.  No hematuria.    A small left inguinal hernia without complaints today.    He denies  sleep apnea.  Denies alcohol.    No epigastric pain or bleeding with the low-dose aspirin.    PMHx:  No past medical history on file.  PSHx:  No past surgical history on file.  Immunizations:   Immunization History   Administered Date(s) Administered     INFLUENZA,RECOMBINANT,INJ,PF QUADRIVALENT 18+YRS 01/23/2019       ROS A comprehensive review of systems was performed and was otherwise negative    Medications, allergies, and problem list were reviewed and updated    Exam  /86 (Patient Site: Right Arm, Patient Position: Sitting, Cuff Size: Adult Large)   Pulse 76   Resp 16   Wt 219 lb 6.4 oz (99.5 kg)   BMI 31.48 kg/m    Alert and oriented x3.  No jaundice.  Lungs are clear.  Heart is regular without murmur.  Abdomen is nontender, 1 cm liver.  No ankle edema.    Assessment/Plan  1. Essential hypertension, benign  Improving control, still borderline high with moderate spikes with stress.    Add amlodipine, could increase to 5 mg a day if blood pressure consistently above 135/85.    I warned patient about lightheaded dizzy spells or fatigue and lower extremity edema risk.  - amLODIPine (NORVASC) 2.5 MG tablet; Take 1 tablet (2.5 mg total) by mouth daily.  Dispense: 90 tablet; Refill: 1  - losartan (COZAAR) 50 MG tablet; Take 1 tablet (50 mg total) by mouth 2 (two) times a day.  Dispense: 180 tablet; Refill: 1    2. Type 2 diabetes mellitus without complication, without long-term current use of insulin (H)  Improving control.  Blood sugars now 110-170.    Increase metformin at supper.  Continue to work on diet and exercise.    Follow-up in early May for hemoglobin A1c check.    He did again declined referral to the diabetic educator  - metFORMIN (GLUCOPHAGE) 500 MG tablet; Take 1 with breakfast.  Take 2 tablets with supper.  Dispense: 270 tablet; Refill: 3    3. Hypercholesterolemia  I again discussed need for statin medication.  Family history of statin intolerance.    Once his blood pressure and diabetes  medication adjustment is finished, I will plan to have him start a low-dose intermittent Crestor 2.5 mg twice a week.    4. Elevated prostate specific antigen (PSA)  Urinary frequency symptoms improving as his blood sugars improved.    If his symptoms do not continue to improve, or his PSA worsens, I will plan a urology referral  - PSA (Prostatic-Specific Antigen), Diagnostic    5. Medication monitoring encounter    - Basic Metabolic Panel    Patient is applying for a job at  and needed a letter regarding his diabetes control.  Letter was dictated for patient today.    Family history of colon cancer with mother.  5 your colonoscopy April 2020.    Questionable increased intraocular pressures, ophthalmology consult next week    Return in about 6 weeks (around 5/2/2019) for Recheck.   Patient Instructions   Increase the evening metformin to 1000 mg.  Continue the 500 mg metformin with breakfast.    Add amlodipine 2.5 mg a day, in addition to your losartan twice a day.  If your blood pressure stays above 135/85, you can increase the amlodipine to 5 mg a day.    See me in about 6 weeks for follow-up on the blood pressure and diabetes.    If your urinary symptoms do not continue to improve, or your PSA is increasing, I will send to urology for evaluation.    Consider starting on a low dose of rosuvastatin for cholesterol in the future.    Jonel Bell MD  I spent a total time with patient of over 25 minutes and over 50% coord care.  Time all face to face.      Current Outpatient Medications   Medication Sig Dispense Refill     ascorbate calcium (VITAMIN C ORAL) Take 1 tablet by mouth daily.       aspirin 81 MG EC tablet Take 1 tablet (81 mg total) by mouth daily.  0     b complex vitamins (B COMPLEX-VITAMIN B12) tablet Take 1 tablet by mouth 3 (three) times a week.       BIOTIN ORAL Take 1 tablet by mouth 3 (three) times a week.              blood glucose test (GLUCOSE BLOOD) strips 2 (two) times a day.        cholecalciferol, vitamin D3, (VITAMIN D3 ORAL) Take 2,000 Units by mouth 3 (three) times a week. Not taken during summer.              chromium picolin,hist/minerals (CHROMIUM PICOLINAT,HISTID-MINS ORAL) Take 1 tablet by mouth 3 (three) times a week.              GYMNEMA LEAF, BULK, MISC Use 1 capsule As Directed 3 (three) times a week.              losartan (COZAAR) 50 MG tablet Take 1 tablet (50 mg total) by mouth 2 (two) times a day. 180 tablet 1     metFORMIN (GLUCOPHAGE) 500 MG tablet Take 1 with breakfast.  Take 2 tablets with supper. 270 tablet 3     MULTIVITAMIN ORAL Take 1 tablet by mouth daily.       UNABLE TO FIND 1 capsule taken orally 3 times weekly. Med Name: Beet root.       UNABLE TO FIND 1 capsule taken orally daily. Med Name: Tumeric.       UNABLE TO FIND 1 capsule taken 3 times weekly. Med Name: Stinging Nettle.       UNABLE TO FIND 2 capsules taken daily. Med Name: Apple cider vinegar.       VANADYL SULFATE, BULK, MISC Use 1 tablet As Directed 3 (three) times a week.       vitamin E acetate (VITAMIN E ORAL) Take 1 tablet by mouth 3 (three) times a week.              amLODIPine (NORVASC) 2.5 MG tablet Take 1 tablet (2.5 mg total) by mouth daily. 90 tablet 1     No current facility-administered medications for this visit.      No Known Allergies  Social History     Tobacco Use     Smoking status: Never Smoker     Smokeless tobacco: Never Used   Substance Use Topics     Alcohol use: Yes     Alcohol/week: 0.0 - 0.6 oz     Drug use: Not on file

## 2021-06-25 NOTE — PATIENT INSTRUCTIONS - HE
Increase the evening metformin to 1000 mg.  Continue the 500 mg metformin with breakfast.    Add amlodipine 2.5 mg a day, in addition to your losartan twice a day.  If your blood pressure stays above 135/85, you can increase the amlodipine to 5 mg a day.    See me in about 6 weeks for follow-up on the blood pressure and diabetes.    If your urinary symptoms do not continue to improve, or your PSA is increasing, I will send to urology for evaluation.    Consider starting on a low dose of rosuvastatin for cholesterol in the future.

## 2021-07-11 ENCOUNTER — HEALTH MAINTENANCE LETTER (OUTPATIENT)
Age: 61
End: 2021-07-11

## 2021-09-05 ENCOUNTER — HEALTH MAINTENANCE LETTER (OUTPATIENT)
Age: 61
End: 2021-09-05

## 2022-02-20 ENCOUNTER — HEALTH MAINTENANCE LETTER (OUTPATIENT)
Age: 62
End: 2022-02-20

## 2022-08-07 ENCOUNTER — HEALTH MAINTENANCE LETTER (OUTPATIENT)
Age: 62
End: 2022-08-07

## 2022-10-29 ENCOUNTER — HEALTH MAINTENANCE LETTER (OUTPATIENT)
Age: 62
End: 2022-10-29

## 2023-06-24 ENCOUNTER — HEALTH MAINTENANCE LETTER (OUTPATIENT)
Age: 63
End: 2023-06-24

## 2023-09-02 ENCOUNTER — HEALTH MAINTENANCE LETTER (OUTPATIENT)
Age: 63
End: 2023-09-02

## 2024-01-20 ENCOUNTER — HEALTH MAINTENANCE LETTER (OUTPATIENT)
Age: 64
End: 2024-01-20